# Patient Record
Sex: FEMALE | Race: WHITE | Employment: FULL TIME | URBAN - METROPOLITAN AREA
[De-identification: names, ages, dates, MRNs, and addresses within clinical notes are randomized per-mention and may not be internally consistent; named-entity substitution may affect disease eponyms.]

---

## 2021-01-14 ENCOUNTER — HOSPITAL ENCOUNTER (EMERGENCY)
Age: 61
Discharge: HOME | End: 2021-01-14
Payer: OTHER GOVERNMENT

## 2021-01-14 ENCOUNTER — APPOINTMENT (OUTPATIENT)
Dept: GENERAL RADIOLOGY | Age: 61
End: 2021-01-14
Payer: OTHER GOVERNMENT

## 2021-01-14 VITALS
BODY MASS INDEX: 26.99 KG/M2 | OXYGEN SATURATION: 97 % | WEIGHT: 162 LBS | RESPIRATION RATE: 16 BRPM | TEMPERATURE: 98.3 F | DIASTOLIC BLOOD PRESSURE: 84 MMHG | SYSTOLIC BLOOD PRESSURE: 122 MMHG | HEIGHT: 65 IN | HEART RATE: 95 BPM

## 2021-01-14 DIAGNOSIS — S93.402A SPRAIN OF LEFT ANKLE, UNSPECIFIED LIGAMENT, INITIAL ENCOUNTER: Primary | ICD-10-CM

## 2021-01-14 PROCEDURE — 73610 X-RAY EXAM OF ANKLE: CPT

## 2021-01-14 PROCEDURE — 99283 EMERGENCY DEPT VISIT LOW MDM: CPT

## 2021-01-14 PROCEDURE — 73630 X-RAY EXAM OF FOOT: CPT

## 2021-01-14 ASSESSMENT — ENCOUNTER SYMPTOMS
VOMITING: 0
COUGH: 0
DIARRHEA: 0
NAUSEA: 0
COLOR CHANGE: 1
SHORTNESS OF BREATH: 0

## 2021-01-14 ASSESSMENT — PAIN DESCRIPTION - PAIN TYPE: TYPE: ACUTE PAIN

## 2021-01-14 ASSESSMENT — PAIN DESCRIPTION - ORIENTATION: ORIENTATION: LEFT

## 2021-01-14 ASSESSMENT — PAIN SCALES - GENERAL: PAINLEVEL_OUTOF10: 8

## 2021-01-15 NOTE — ED TRIAGE NOTES
Patient arrived to ED from home after fall 1 hour ago. Patient report tripped on a hole from a step in sidewalk injuring L. Foot, patient states she was taking dog out for a walk. Patient states that he had immediate pain and wasn't able to bear weight on the front of foot. Patient reports pain at 8/10 with movement. Patient put splint on L. foot prior to arrival.Pt is A&Ox4.

## 2021-01-15 NOTE — ED PROVIDER NOTES
1 [facility]  EMERGENCY DEPARTMENT ENCOUNTER          PHYSICIAN ASSISTANT NOTE       Date of evaluation: 1/14/2021    Chief Complaint     Foot Injury (L. foot)      History of Present Illness     HPI: Avelina Tamez is a 64 y.o. female who presents to the emergency department with a 3 hour history of left foot pain. The patient states that she was outside walking her dog when she stepped into a hole and twisted her ankle. Patient states that she heard a \"crack and pop\" and instantly felt a sharp pain. She states that she needed assistance to get up, and was able to walk with some weight but significant pain. Patient states that she is experiencing a constant throbbing pain that is 7/10 on the pain scale. She has not taken anything over the counter to alleviate her symptoms. Patient states that her symptoms increase with walking on the foot and pressing on the top of her foot. Patient denies loss of sensation in her toes, chest pain, fever, and shortness of breath. Affirms pain, bruising and swelling of the left foot. With the exception of the above, there are no aggravating or alleviating factors. Review of Systems     Review of Systems   Constitutional: Negative for chills and fever. Respiratory: Negative for cough and shortness of breath. Cardiovascular: Negative for chest pain and palpitations. Gastrointestinal: Negative for diarrhea, nausea and vomiting. Musculoskeletal: Positive for joint swelling. Skin: Positive for color change. Mild ecchymosis of the proximal dorsal aspect of the left foot, pain to palpation   Neurological: Negative for numbness and headaches. As stated above, all other systems reviewed and are otherwise negative. Past Medical, Surgical, Family, and Social History     She has no past medical history on file. She has no past surgical history on file. Her family history is not on file. She reports that she has been smoking.  She has been smoking about 0.25 packs per day. She has never used smokeless tobacco. She reports previous alcohol use. She reports that she does not use drugs. Medications     There are no discharge medications for this patient. Allergies     She has No Known Allergies. Physical Exam     INITIAL VITALS: BP: 122/84, Temp: 98.3 °F (36.8 °C), Pulse: 95, Resp: 16, SpO2: 97 %  Physical Exam  Constitutional:       Appearance: Normal appearance. She is normal weight. HENT:      Head: Normocephalic and atraumatic. Cardiovascular:      Rate and Rhythm: Normal rate and regular rhythm. Heart sounds: Normal heart sounds. No murmur. No friction rub. No gallop. Pulmonary:      Effort: Pulmonary effort is normal. No respiratory distress. Breath sounds: Normal breath sounds. Musculoskeletal: Normal range of motion. General: Swelling, tenderness and signs of injury present. No deformity. Comments: Mild ecchymosis and edema of the proximal dorsolateral aspect of the left foot most focally at the midshaft of the fourth and fifth metatarsals without overlying skin changes or visible deformity; pain to palpation and with dorsal flexion and eversion of the ankle. Neurovascularly intact distal to the area of injury. No fibular head tenderness. Skin:     General: Skin is warm and dry. Capillary Refill: Capillary refill takes less than 2 seconds. Findings: Bruising present. Neurological:      General: No focal deficit present. Mental Status: She is alert and oriented to person, place, and time. Psychiatric:         Mood and Affect: Mood normal.         Thought Content: Thought content normal.         Judgment: Judgment normal.       Diagnostic Results     RADIOLOGY:  XR FOOT LEFT (MIN 3 VIEWS)   Final Result      1. No findings for acute bony or soft tissue abnormality. 2.  Plantar calcaneal spur.                   3 VIEWS LEFT FOOT      HISTORY: Pain      FINDINGS: There is no fracture or dislocation. Joint spaces are well-maintained. No focal bony erosive process or periosteal new bone. Visualized bones of the midfoot are intact. No soft tissue normality seen. Plantar calcaneal spur identified. IMPRESSION:      1. No findings for acute bony or soft tissue abnormality. XR ANKLE LEFT (MIN 3 VIEWS)   Final Result      1. No findings for acute bony or soft tissue abnormality. 2.  Plantar calcaneal spur. 3 VIEWS LEFT FOOT      HISTORY: Pain      FINDINGS: There is no fracture or dislocation. Joint spaces are well-maintained. No focal bony erosive process or periosteal new bone. Visualized bones of the midfoot are intact. No soft tissue normality seen. Plantar calcaneal spur identified. IMPRESSION:      1. No findings for acute bony or soft tissue abnormality. LABS:   No results found for this visit on 01/14/21. RECENT VITALS:  BP: 122/84, Temp: 98.3 °F (36.8 °C), Pulse: 95, Resp: 16, SpO2: 97 %     Procedures     n/a    ED Course     Nursing Notes, Past Medical Hx,Past Surgical Hx, Social Hx, Allergies, and Family Hx were reviewed. The patient was given the following medications:  No orders of the defined types were placed in this encounter. CONSULTS:  None    MEDICAL DECISION MAKING / ASSESSMENT / PLAN     Vitals:    01/14/21 2048   BP: 122/84   Pulse: 95   Resp: 16   Temp: 98.3 °F (36.8 °C)   TempSrc: Oral   SpO2: 97%   Weight: 162 lb (73.5 kg)   Height: 5' 5\" (1.651 m)       Avelina Tamez is a 64 y.o. female who presents to the emergency department with left foot pain after stepping into a hole and twisting her ankle at 19:00 this evening. The patient has remained hemodynamically stable throughout their stay in the emergency department, and appears well overall. Pertinent exam revealed mild ecchymosis and edema of the proximal aspect of the dorsal aspect of the foot. Patient has full active and passive ROM of the left ankle.   She most focally has tenderness overlying the lateral fourth and fifth metatarsals with associated right lateral malleolus tenderness. She has no fibular head tenderness. Based on her areas of tenderness x-rays were obtained of her foot and ankle, I do not feel that she requires imaging of her entire tibia/fibula as she has no proximal or mid tibia/fibula tenderness on exam.    The patient was given ice for swelling and pain in the ED. Patient declined any pain medication here in the emergency department. X-ray did not show any acute osseous abnormality. For symptom control the patient will be placed in an air splint and provided with crutches to help with ambulation. I discussed all this with the patient, who feels comfortable with the plan for discharge home. The patient is visiting from [place] and was encouraged to follow-up closely with her home primary care doctor and orthopedic doctor of choice. She is in agreement with this plan. She was given strict return precautions. I do not believe that this patient requires any further work-up or inpatient management for their complaints, and are appropriate for outpatient follow-up. I discussed the findings of my physical exam and work-up with the patient, and they were given the opportunity to ask questions. The patient is agreeable with the plan and is ready to be discharged. The patient was discharged home with prescriptions for left ankle air cast and crutches for support. Patient instructed to follow-up with PCP and ortho at [facility] as soon as possible, and given strict return precautions. Clinical Impression     1. Sprain of left ankle, unspecified ligament, initial encounter        This note was dictated using voice-recognition software, which occasionally leads to inadvertent typographic errors.     Disposition     PATIENT REFERRED TO:  Your primary care doctor and orthopedic doctor at home in [place]:  There are no discharge medications for this patient.       DISPOSITION Decision To Discharge 01/14/2021 10:33:50 PM     Juan J Chandlerma  01/14/21 [id number]

## 2021-01-15 NOTE — ED NOTES
Air cast placed. Instructed pt how to ambulate on crutches. She was able to demonstrate safe use. Discharge instructions reviewed. Pt verbalized understanding. Pt left the unit via wheelchair accompanied by RN and sister to return home via private car.      Abigail Mayfield RN  01/14/21 [id number]